# Patient Record
(demographics unavailable — no encounter records)

---

## 2017-07-29 NOTE — RADIOLOGY REPORT (SQ)
EXAM DESCRIPTION:  CT HEAD WITHOUT



COMPLETED DATE/TIME:  7/29/2017 11:10 pm



REASON FOR STUDY:  ams



COMPARISON:  None.



TECHNIQUE:  Axial images acquired through the brain without intravenous contrast.  Images reviewed wi
th bone, brain and subdural windows.  Images stored on PACS.

All CT scanners at this facility use dose modulation, iterative reconstruction, and/or weight based d
osing when appropriate to reduce radiation dose to as low as reasonably achievable (ALARA).

[facility]: Dose Right  [facility]: CareDose    [facility]: Dose Right    [facility]: Teradose 4D    [facility]: Smart Technologies



RADIATION DOSE:   mGy.



LIMITATIONS:  None.



FINDINGS:  VENTRICLES: Prominent.

CEREBRUM: No masses.  No hemorrhage.  No midline shift.  Areas of low density in the white matter mos
t likely due to chronic micro-vascular ischemic change.  No evidence for acute infarction.

CEREBELLUM: No masses.  No hemorrhage.  No alteration of density.  No evidence for acute infarction.

EXTRAAXIAL SPACES: Mild age-related involutional change.  No fluid collections.  No masses.

ORBITS AND GLOBE: No intra- or extraconal masses.  Normal contour of globe without masses.

CALVARIUM: No fracture.

PARANASAL SINUSES: No fluid or mucosal thickening.

SOFT TISSUES: No mass or hematoma.

OTHER: Atherosclerotic vascular calcifications are seen within the cavernous segments of the internal
 carotid arteries bilaterally seen.  There appear to be supernumerary teeth imbedded within the hard 
palate.



IMPRESSION:  MILD CHRONIC CHANGES OF ATROPHY AND MICROVASCULAR ISCHEMIA.  NO ACUTE PROCESS.



TECHNICAL DOCUMENTATION:  JOB ID:  [id number]

Quality ID # [id number]: Final reports with documentation of one or more dose reduction techniques (e.g., Au
tomated exposure control, adjustment of the mA and/or kV according to patient size, use of iterative 
reconstruction technique)

 2011 [employer]- All Rights Reserved

## 2017-07-29 NOTE — ER DOCUMENT REPORT
ED General





- General


Stated Complaint: ALTERED MENTAL STATUS


Time Seen by Provider: 07/29/17 22:47


Cannot obtain history due to: Altered mental status


Notes: 


Patient is a 57-year-old male with unknown past medical history although he 

reports a history of anxiety who presents after apparently driving always from 

[facility] today specifically to come to come to [facility].  

Patient states he drove for the past 16 hours without stopping except for 

gasoline.  States he has only urinated once in the past 24 hours.  History is 

otherwise extremely limited as patient has severely pressured speech, is 

tangential in his thought process and is unable to provide any meaningful 

information other than that he is worried that he may be having a stroke.





- Related Data


Allergies/Adverse Reactions: 


 





ketorolac [From Toradol] Adverse Reaction (Verified 07/29/17 22:53)


 








Home Medications: 


 Current Home Medications





Aspirin [Ecotrin] 81 mg PO DAILY 07/29/17 [History]


Buprenorphine 1 each TD 07/29/17 [History]


Caffeine 2 tab PO BID 07/29/17 [History]


Clonazepam [Clonazepam] 0.5 mg PO BID PRN 07/29/17 [History]


Ibuprofen [Motrin 600 mg Tablet] 2 - 3 tab PO PRN PRN 07/29/17 [History]


Multivitamin [Multivitamins] 1 tab PO DAILY 07/29/17 [History]


Pregabalin [Lyrica 75 mg Capsule] 75 mg PO TID 07/29/17 [History]


Topiramate 50 mg PO QHS 07/29/17 [History]


Trazodone HCl 1 - 3 tab PO QHS PRN 07/29/17 [History]











Past Medical History





- General


Information source: Patient


Cannot obtain history due to: Uncooperative, Altered mental status





- Social History


Smoking Status: Never Smoker


Frequency of alcohol use: None


Drug Abuse: None - There is no breakdown


Lives with: Other


Family History: Reviewed & Not Pertinent





Review of Systems





- Review of Systems


-: Yes ROS unobtainable due to patient's medical condition





Physical Exam





- Vital signs


Vitals: 


 











Resp Pulse Ox


 


 16   100 


 


 07/29/17 22:47  07/29/17 22:47











Interpretation: Hypertensive, Tachycardic, Tachypneic


Notes: 


PHYSICAL EXAMINATION:





GENERAL: Agitated, somewhat combative.





HEAD: Atraumatic, normocephalic.





EYES: Pupils equal round and reactive to light, extraocular movements intact, 

sclera anicteric, conjunctiva are normal.





ENT: nares patent, oropharynx clear without exudates.  Dry mucous membranes.





NECK: Normal range of motion, supple without lymphadenopathy





LUNGS: Breath sounds clear to auscultation bilaterally and equal.  No wheezes 

rales or rhonchi. Hyperventilating 





HEART: Regular tachycardia without murmurs





ABDOMEN: Soft, nontender, normoactive bowel sounds.  No guarding, no rebound.  

No masses appreciated.





EXTREMITIES: Normal range of motion, no pitting or edema.  No cyanosis.





NEUROLOGICAL: Face symmetric.  Tongue protrudes midline.  Extraocular motions 

intact.  Pupils are 2 mm and equally reactive.  Pressured, fluent speech. 5 out 

of 5 strength in both the distal and proximal upper and lower extremities 

bilaterally.  Sensation is grossly intact throughout.  





PSYCH: Presents is acutely manic with pressured speech, tangential through 

process





SKIN: Warm, Dry, normal turgor, no rashes or lesions noted.





Course





- Re-evaluation


Re-evalutation: 


07/29/17 22:48


Patient presents acutely agitated, tremulous, tangential thought process 

although does not appear to be actively responding to internal stimuli.  He has 

very pressured speech and presents as acutely manic.  The patient did drive 

himself here today all the way from [facility] states he only stopped for 

gas did not eat or drink and has only urinated once in the past 24 hours.  

Patient is a very otherwise difficult historian and has difficulty answering my 

questions.  He does not have any focal neurologic deficits on examination.  

Does appear somewhat dehydrated and appears likewise to be having a panic 

attack with hyperventilation, tachycardia, and tremulousness.  IV access is 

established, IV midabdomen will be administered.  Will begin IV fluids given 

that he does appear dehydrated and is also quite tachycardic.  Will obtain 

broad laboratories, CT the head, reassess.  Patient is critically ill at this 

time and undifferentiated altered mental status and require frequent 

reassessments.





07/29/17 23:37


Patient is much more calm, cooperative relaxed at this time vitals have 

normalized after receiving Versed and fluids.  CT the head is unremarkable.  

Laboratory to demonstrate findings consistent with acute dehydration which is 

consistent with patient's report that he has not really been eating or drinking 

over the last several days and not urinated once in the past 24 hours.  He will 

be evaluated by psychiatry in the morning.  Awaiting his urine results.  He is 

no longer unstable at this time.





07/30/17 00:29


Patient is calm, cooperative now although remains with some mildly pressured 

speech.  He continues to deny any acute safety concerns.  He does not  meet 

criteria for involuntary commitment at this time as he denies any acute safety 

concerns, has a much clearer thought process, and did not clearly present with 

any dangerous behavior toward anybody else.  He is medically cleared at this 

time for evaluation in the morning.





- Vital Signs


Vital signs: 


 











Temp Pulse Resp BP Pulse Ox


 


       23 H  135/92 H  94 


 


       07/29/17 23:01  07/29/17 23:01  07/29/17 23:01














- Laboratory


Result Diagrams: 


 07/29/17 22:41





 07/29/17 22:41


Laboratory results interpreted by me: 


 











  07/29/17 07/29/17 07/30/17





  22:41 22:41 00:00


 


WBC  14.9 H  


 


RDW  14.6 H  


 


Absolute Neutrophils  9.9 H  


 


Sodium   147.2 H 


 


Chloride   119 H 


 


Carbon Dioxide   15 L 


 


BUN   31 H 


 


Creatinine   1.99 H 


 


Est GFR ( Amer)   42 L 


 


Est GFR (Non-Af Amer)   35 L 


 


Calcium   10.5 H 


 


Total Protein   8.5 H 


 


Urine Protein    100 H


 


Urine Ascorbic Acid    20 H


 


Salicylates   < 1.0 L 


 


Acetaminophen   < 10 L 














- Diagnostic Test


Radiology reviewed: Image reviewed, Reports reviewed


Radiology results interpreted by me: 





07/30/17 00:29


CT head: No acute intracranial bleed





- EKG Interpretation by Me


Additional EKG results interpreted by me: 





07/30/17 00:30


Sinus rhythm.  Rate 97.  Intermittent PVCs.  No ST elevations or depressions.  

QTC is 417.





Critical Care Note





- Critical Care Note


Total time excluding time spent on procedures (mins): 38


Comments: 


Critical care time spent obtaining history from patient or surrogate, 

discussions with consultants, development of treatment plan with patient or 

surrogate, evaluation of patient's response to treatment, examination of patient

, ordering and performing treatments and interventions, ordering and review of 

laboratory studies, re-evaluation of patient's condition, ordering and review 

of radiographic studies and review of old charts





Discharge





- Discharge


Clinical Impression: 


 Manic state, Agitation





Altered mental status


Qualifiers:


 Altered mental status type: unspecified Qualified Code(s): R41.82 - Altered 

mental status, unspecified





Condition: Stable


Disposition: PSYCH HOSP/UNIT

## 2017-07-30 NOTE — EKG REPORT
SEVERITY:- OTHERWISE NORMAL ECG -

SINUS RHYTHM

VENTRICULAR PREMATURE COMPLEX

:

Confirmed by: Theodora Dixon MD 30-Jul-2017 11:11:52
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
